# Patient Record
Sex: MALE | Race: OTHER
[De-identification: names, ages, dates, MRNs, and addresses within clinical notes are randomized per-mention and may not be internally consistent; named-entity substitution may affect disease eponyms.]

---

## 2019-07-03 ENCOUNTER — HOSPITAL ENCOUNTER (EMERGENCY)
Dept: HOSPITAL 72 - EMR | Age: 59
Discharge: HOME | End: 2019-07-03
Payer: COMMERCIAL

## 2019-07-03 VITALS — SYSTOLIC BLOOD PRESSURE: 127 MMHG | DIASTOLIC BLOOD PRESSURE: 86 MMHG

## 2019-07-03 VITALS — BODY MASS INDEX: 24.34 KG/M2 | HEIGHT: 70 IN | WEIGHT: 170 LBS

## 2019-07-03 VITALS — DIASTOLIC BLOOD PRESSURE: 86 MMHG | SYSTOLIC BLOOD PRESSURE: 127 MMHG

## 2019-07-03 DIAGNOSIS — J20.9: ICD-10-CM

## 2019-07-03 DIAGNOSIS — J06.9: Primary | ICD-10-CM

## 2019-07-03 DIAGNOSIS — F17.200: ICD-10-CM

## 2019-07-03 LAB
ADD MANUAL DIFF: NO
ANION GAP SERPL CALC-SCNC: 9 MMOL/L (ref 5–15)
BASOPHILS NFR BLD AUTO: 1.2 % (ref 0–2)
BUN SERPL-MCNC: 11 MG/DL (ref 7–18)
CALCIUM SERPL-MCNC: 9 MG/DL (ref 8.5–10.1)
CHLORIDE SERPL-SCNC: 105 MMOL/L (ref 98–107)
CO2 SERPL-SCNC: 27 MMOL/L (ref 21–32)
CREAT SERPL-MCNC: 1.3 MG/DL (ref 0.55–1.3)
EOSINOPHIL NFR BLD AUTO: 2.6 % (ref 0–3)
ERYTHROCYTE [DISTWIDTH] IN BLOOD BY AUTOMATED COUNT: 13.7 % (ref 11.6–14.8)
HCT VFR BLD CALC: 45.3 % (ref 42–52)
HGB BLD-MCNC: 14.5 G/DL (ref 14.2–18)
LYMPHOCYTES NFR BLD AUTO: 32.9 % (ref 20–45)
MCV RBC AUTO: 85 FL (ref 80–99)
MONOCYTES NFR BLD AUTO: 12.6 % (ref 1–10)
NEUTROPHILS NFR BLD AUTO: 50.7 % (ref 45–75)
PLATELET # BLD: 177 K/UL (ref 150–450)
POTASSIUM SERPL-SCNC: 4 MMOL/L (ref 3.5–5.1)
RBC # BLD AUTO: 5.32 M/UL (ref 4.7–6.1)
SODIUM SERPL-SCNC: 141 MMOL/L (ref 136–145)
WBC # BLD AUTO: 4.4 K/UL (ref 4.8–10.8)

## 2019-07-03 PROCEDURE — 85025 COMPLETE CBC W/AUTO DIFF WBC: CPT

## 2019-07-03 PROCEDURE — 71045 X-RAY EXAM CHEST 1 VIEW: CPT

## 2019-07-03 PROCEDURE — 99284 EMERGENCY DEPT VISIT MOD MDM: CPT

## 2019-07-03 PROCEDURE — 80048 BASIC METABOLIC PNL TOTAL CA: CPT

## 2019-07-03 PROCEDURE — 94640 AIRWAY INHALATION TREATMENT: CPT

## 2019-07-03 PROCEDURE — 36415 COLL VENOUS BLD VENIPUNCTURE: CPT

## 2019-07-03 NOTE — NUR
ED Nurse Note:

walked in to ED due to multiple c.o.  right ear clogged for couple months.  
unable to hear on that site for 2 days. chest congestion with cough for 2 days. 
 no fever or chills reported.  per pt, clear thin phelgm noted.  also c/o left 
groin area for several months.  no prior injury reported.  per pt, pain gets 
worse with walking.  breath sounds clear, intermittent dry cough noted.  
respirations even and non-labored noted.  skin warm to touch.  no open wound 
noted.  AAO x4.

## 2019-07-03 NOTE — EMERGENCY ROOM REPORT
History of Present Illness


General


Chief Complaint:  Upper Respiratory Illness


Source:  Patient





Present Illness


HPI


This patient complains of cough and congestion for the past few days.  He 

denies chest pain or shortness of breath.  He denies abdominal pain.  He states 

he also has a lot of wax in his ears and states he would like this cleaned out.

  He also has some tenderness in his left groin area.  He denies fever or 

chills.  He denies nausea or vomiting.  He has no other complaints.


Allergies:  


Coded Allergies:  


     No Known Allergies (Unverified , 7/3/19)





Patient History


Past Medical History:  none, see triage record


Social History:  Reports: smoking; Denies: alcohol use, drug use


Reviewed Nursing Documentation:  PMH: Agreed; PSxH: Agreed





Nursing Documentation-PMH


Past Medical History:  No Stated History





Review of Systems


All Other Systems:  negative except mentioned in HPI





Physical Exam





Vital Signs








  Date Time  Temp Pulse Resp B/P (MAP) Pulse Ox O2 Delivery O2 Flow Rate FiO2


 


7/3/19 12:10 98.4 81 20 127/86 (100) 97 Room Air  


 


7/3/19 12:59        21








Sp02 EP Interpretation:  reviewed, normal


General Appearance:  no apparent distress, alert, GCS 15, non-toxic


Head:  normocephalic, atraumatic


Eyes:  bilateral eye normal inspection, bilateral eye PERRL


ENT:  hearing grossly normal, normal pharynx, no angioedema, normal voice


Neck:  full range of motion, supple/symm/no masses


Respiratory:  chest non-tender, no respiratory distress, no retraction, no 

accessory muscle use, speaking full sentences, wheezing, expiration


Cardiovascular #1:  regular rate, rhythm, no edema


Gastrointestinal:  normal bowel sounds, non tender, soft, non-distended, no 

guarding, no rebound


Rectal:  deferred


Musculoskeletal:  back normal, gait/station normal, normal range of motion, non-

tender


Neurologic:  alert, oriented x3, responsive, motor strength/tone normal, 

sensory intact, speech normal


Psychiatric:  judgement/insight normal, memory normal, mood/affect normal, no 

suicidal/homicidal ideation





Medical Decision Making


Diagnostic Impression:  


 Primary Impression:  


 Upper respiratory infection


ER Course


This patient has acute bronchitis.  I suspect he also has undiagnosed COPD.  He 

did have slight wheezing and an irritable cough.  He was given a breathing 

treatment.  There is no evidence of pneumonia on chest x-ray.  Overall, he is 

well-appearing.  Patient's earwax was irrigated by the ER tech.  At this time, 

I did not identify an emergency medical condition.  The evaluation was very 

reassuring with a normal lung exam, no respiratory distress, normal pulse 

oximetry.  I am not concerned for pneumonia in this patient.





Laboratory Tests








Test


  7/3/19


13:04


 


White Blood Count


  4.4 K/UL


(4.8-10.8)  L


 


Red Blood Count


  5.32 M/UL


(4.70-6.10)


 


Hemoglobin


  14.5 G/DL


(14.2-18.0)


 


Hematocrit


  45.3 %


(42.0-52.0)


 


Mean Corpuscular Volume 85 FL (80-99)  


 


Mean Corpuscular Hemoglobin


  27.3 PG


(27.0-31.0)


 


Mean Corpuscular Hemoglobin


Concent 32.0 G/DL


(32.0-36.0)


 


Red Cell Distribution Width


  13.7 %


(11.6-14.8)


 


Platelet Count


  177 K/UL


(150-450)


 


Mean Platelet Volume


  7.3 FL


(6.5-10.1)


 


Neutrophils (%) (Auto)


  50.7 %


(45.0-75.0)


 


Lymphocytes (%) (Auto)


  32.9 %


(20.0-45.0)


 


Monocytes (%) (Auto)


  12.6 %


(1.0-10.0)  H


 


Eosinophils (%) (Auto)


  2.6 %


(0.0-3.0)


 


Basophils (%) (Auto)


  1.2 %


(0.0-2.0)


 


Sodium Level


  141 MMOL/L


(136-145)


 


Potassium Level


  4.0 MMOL/L


(3.5-5.1)


 


Chloride Level


  105 MMOL/L


()


 


Carbon Dioxide Level


  27 MMOL/L


(21-32)


 


Anion Gap


  9 mmol/L


(5-15)


 


Blood Urea Nitrogen


  11 mg/dL


(7-18)


 


Creatinine


  1.3 MG/DL


(0.55-1.30)


 


Estimate Glomerular


Filtration Rate 56.5 mL/min


(>60)


 


Glucose Level


  100 MG/DL


()


 


Calcium Level


  9.0 MG/DL


(8.5-10.1)








Chest X-Ray Diagnostic Results


Chest X-Ray Diagnostic Results :  


   Chest X-Ray Ordered:  Yes


   # of Views/Limited/Complete:  1 View


   Indication:  Other


   EP Interpretation:  Yes


   Interpretation:  no consolidation, no effusion, no pneumothorax, no acute 

cardiopulmonary disease


   Impression:  No acute disease


   Electronically Signed by:  Nicole Anderson DO





Last Vital Signs








  Date Time  Temp Pulse Resp B/P (MAP) Pulse Ox O2 Delivery O2 Flow Rate FiO2


 


7/3/19 13:09  57 18  100 Room Air  21


 


7/3/19 12:25 98.4   127/86    








Status:  improved


Disposition:  HOME, SELF-CARE


Condition:  Improved


Referrals:  


[facility],REFERRING (PCP)











Nicole Anderson DO Jul 3, 2019 14:29

## 2019-07-03 NOTE — NUR
ER DISCHARGE NOTE:

Patient is cleared to be discharged per ERMPHYLLIS BERGERON, pt is aox4, on room 
air, with stable vital signs. pt was given dc instructions, prescriptions sent 
electronically, pt was able to verbalize understanding, pt id band and iv site 
removed without complications. pt is able to ambulate with steady gait. pt took 
all belongings.